# Patient Record
Sex: FEMALE | Race: WHITE | Employment: STUDENT | RURAL
[De-identification: names, ages, dates, MRNs, and addresses within clinical notes are randomized per-mention and may not be internally consistent; named-entity substitution may affect disease eponyms.]

---
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
## 2024-07-08 ENCOUNTER — HOSPITAL ENCOUNTER (EMERGENCY)
Facility: HOSPITAL | Age: 24
Discharge: HOME | End: 2024-07-08
Attending: HOSPITALIST

## 2024-07-08 VITALS
SYSTOLIC BLOOD PRESSURE: 98 MMHG | TEMPERATURE: 98 F | OXYGEN SATURATION: 98 % | RESPIRATION RATE: 16 BRPM | HEART RATE: 71 BPM | DIASTOLIC BLOOD PRESSURE: 68 MMHG

## 2024-07-08 DIAGNOSIS — U07.1 COVID-19: Primary | ICD-10-CM

## 2024-07-08 LAB
FLUAV AG NPH QL: NEGATIVE
FLUBV AG NPH QL: NEGATIVE
S PYO AG THROAT QL: NEGATIVE
SARS-COV-2 RDRP RESP QL NAA+PROBE: DETECTED

## 2024-07-08 PROCEDURE — 87880 STREP A ASSAY W/OPTIC: CPT

## 2024-07-08 PROCEDURE — 87635 SARS-COV-2 COVID-19 AMP PRB: CPT

## 2024-07-08 PROCEDURE — 87804 INFLUENZA ASSAY W/OPTIC: CPT

## 2024-07-08 PROCEDURE — 99283 EMERGENCY DEPT VISIT LOW MDM: CPT

## 2024-07-08 RX ORDER — ONDANSETRON 4 MG/1
4 TABLET, ORALLY DISINTEGRATING ORAL EVERY 4 HOURS PRN
Qty: 21 TABLET | Refills: 0 | Status: SHIPPED | OUTPATIENT
Start: 2024-07-08

## 2024-07-08 NOTE — ED TRIAGE NOTES
Pt reports she started having Body aches, nausea, sorethroat, no feeling well early today. Pt advised that other in her household are + for COVID.

## 2024-07-08 NOTE — ED PROVIDER NOTES
of started today he could just be too early for a positive COVID test she does state her understanding of this.  Otherwise resting comfortably stretcher no acute distress nontoxic-appearing at this time.  Final disposition return once her diagnostic studies been performed reviewed.    Rapid COVID screen was positive    Rapid strep screen was negative    Rapid influenza screen was negative    Patient's sick studies were discussed and she does state understanding.  I did discuss with her about supportive care with use of Tylenol Motrin for body aches fevers chills and fluid resuscitation.  Advised that she does need to follow-up with a regular family physician within the next 1 to 2 days for evaluation.  She was also given instruction if her symptoms worsens or new symptoms arise she should return back to emergency department on further evaluation workup.  Advised the quarantine situation now.  She will be out for 5 days at the end of those 5 days if she is afebrile not having any shortness of breath and her symptoms have improved she is okay to go back to work but she must wear a mask at all times while at work.  She does state her understanding of this otherwise should be discharged home in stable condition.  Will write a prescription for some Zofran for nausea as needed.       CONSULTS: (Who and What was discussed)  None    Discussion with Other Profesionals : None    Social Determinants : None    Chronic Conditions:  has a past medical history of Environmental allergies and Scoliosis.    Records Reviewed : None    Disposition Considerations (include 1 Tests not done, Shared Decision Making, Pt Expectation of Test or Tx.): Agreeable to have rapid swabs for influenza strep and COVID  Appropriate for outpatient management with supportive care work excuse and PCP follow-up      I am the Primary Clinician of Record.    FINAL IMPRESSION      1. COVID-19          DISPOSITION/PLAN     DISPOSITION Decision To Discharge